# Patient Record
(demographics unavailable — no encounter records)

---

## 2017-12-12 NOTE — MAMMOGRAPHY REPORT
LEFT DIGITAL DIAGNOSTIC MAMMOGRAM: 12/12/17 09:35:00



CLINICAL: For clip placement immediately status post ultrasound biopsy.



COMPARISON:A recent outside mammogram.



FINDINGS: A biopsy clip is now identified in the lower inner quadrant 

and correlates with the mass identified by ultrasound.





IMPRESSION: Concordant clip placement status post ultrasound biopsy.



BI-RADS CATEGORY:  4--Suspicious



Pathology pending.